# Patient Record
Sex: FEMALE | Race: OTHER | Employment: FULL TIME | URBAN - METROPOLITAN AREA
[De-identification: names, ages, dates, MRNs, and addresses within clinical notes are randomized per-mention and may not be internally consistent; named-entity substitution may affect disease eponyms.]

---

## 2024-05-06 ENCOUNTER — APPOINTMENT (OUTPATIENT)
Age: 49
End: 2024-05-06
Payer: COMMERCIAL

## 2024-05-06 ENCOUNTER — HOSPITAL ENCOUNTER (EMERGENCY)
Age: 49
Discharge: HOME | End: 2024-05-06
Attending: EMERGENCY MEDICINE
Payer: COMMERCIAL

## 2024-05-06 VITALS
DIASTOLIC BLOOD PRESSURE: 93 MMHG | SYSTOLIC BLOOD PRESSURE: 142 MMHG | OXYGEN SATURATION: 100 % | TEMPERATURE: 98 F | WEIGHT: 229.1 LBS | RESPIRATION RATE: 17 BRPM | HEART RATE: 70 BPM | BODY MASS INDEX: 35.96 KG/M2 | HEIGHT: 67 IN

## 2024-05-06 DIAGNOSIS — T07.XXXA MULTIPLE CONTUSIONS: Primary | ICD-10-CM

## 2024-05-06 PROCEDURE — 73562 X-RAY EXAM OF KNEE 3: CPT

## 2024-05-06 PROCEDURE — 73030 X-RAY EXAM OF SHOULDER: CPT

## 2024-05-06 PROCEDURE — 73110 X-RAY EXAM OF WRIST: CPT

## 2024-05-06 PROCEDURE — 99283 EMERGENCY DEPT VISIT LOW MDM: CPT

## 2024-05-06 PROCEDURE — 6370000000 HC RX 637 (ALT 250 FOR IP): Performed by: EMERGENCY MEDICINE

## 2024-05-06 RX ORDER — IBUPROFEN 800 MG/1
800 TABLET ORAL EVERY 8 HOURS PRN
Qty: 15 TABLET | Refills: 3 | Status: SHIPPED | OUTPATIENT
Start: 2024-05-06

## 2024-05-06 RX ORDER — ACETAMINOPHEN 325 MG/1
650 TABLET ORAL ONCE
Status: COMPLETED | OUTPATIENT
Start: 2024-05-06 | End: 2024-05-06

## 2024-05-06 RX ADMIN — ACETAMINOPHEN 650 MG: 325 TABLET ORAL at 11:43

## 2024-05-06 ASSESSMENT — PAIN DESCRIPTION - ORIENTATION
ORIENTATION: RIGHT;LEFT
ORIENTATION: LEFT;RIGHT

## 2024-05-06 ASSESSMENT — PAIN DESCRIPTION - LOCATION
LOCATION: SHOULDER;WRIST;KNEE
LOCATION: WRIST;KNEE

## 2024-05-06 ASSESSMENT — PAIN DESCRIPTION - DESCRIPTORS
DESCRIPTORS: ACHING
DESCRIPTORS: SHARP

## 2024-05-06 ASSESSMENT — PAIN SCALES - GENERAL
PAINLEVEL_OUTOF10: 7
PAINLEVEL_OUTOF10: 9

## 2024-05-06 ASSESSMENT — LIFESTYLE VARIABLES
HOW OFTEN DO YOU HAVE A DRINK CONTAINING ALCOHOL: NEVER
HOW MANY STANDARD DRINKS CONTAINING ALCOHOL DO YOU HAVE ON A TYPICAL DAY: PATIENT DOES NOT DRINK

## 2024-05-06 NOTE — ED PROVIDER NOTES
following medications:   Medications   acetaminophen (TYLENOL) tablet 650 mg (650 mg Oral Given 5/6/24 1143)        CONSULTS:   None   Discussion with Other Professionals: None   {Social Determinants: None   Chronic Conditions:  None    Records Reviewed: NA      Disposition Considerations: Home, self-care  I am the Primary Clinician of Record.        FINAL IMPRESSION    1. Multiple contusions           DISPOSITION/PLAN:     Review imaging results with patient  Provide referral for outpatient primary care  Electronically prescribed Motrin 800 mg  Print instructions on contusions for patient  Discharge outpatient management        Pt discharged home in stable condition.     PATIENT REFERRED TO:   [facility]  [phone]        [facility] Emergency Dept  [address] [place] [address] [phone]  Go to   As needed       DISCHARGE MEDICATIONS:   New Prescriptions    IBUPROFEN (ADVIL;MOTRIN) 800 MG TABLET    Take 1 tablet by mouth every 8 hours as needed for Pain        DISCONTINUED MEDICATIONS:   Discontinued Medications    No medications on file              (Please note that portions of this note were completed with a voice recognition program.  Efforts were made to edit the dictations but occasionally words are mis-transcribed.)       Joel Rios MD (electronically signed)              Joel Rios MD  05/06/24 2009

## 2024-05-06 NOTE — ED TRIAGE NOTES
Patient presents to ED after a mechanical fall, patient reportedly fell over a co-worker. Fell on left side. Complains of left shoulder, left wrist, right wrist and right knee pain. Did not strike head.

## 2024-05-07 ENCOUNTER — TELEPHONE (OUTPATIENT)
Dept: ORTHOPEDIC SURGERY | Age: 49
End: 2024-05-07

## 2024-05-07 NOTE — TELEPHONE ENCOUNTER
Per patient no appointment in needed at this time. Upon return call please schedule with Dr. Cantor.

## 2024-05-09 ENCOUNTER — HOSPITAL ENCOUNTER (EMERGENCY)
Age: 49
Discharge: HOME | End: 2024-05-09
Attending: EMERGENCY MEDICINE
Payer: COMMERCIAL

## 2024-05-09 VITALS
WEIGHT: 225.09 LBS | RESPIRATION RATE: 16 BRPM | BODY MASS INDEX: 35.33 KG/M2 | OXYGEN SATURATION: 99 % | SYSTOLIC BLOOD PRESSURE: 135 MMHG | TEMPERATURE: 97.9 F | HEART RATE: 67 BPM | HEIGHT: 67 IN | DIASTOLIC BLOOD PRESSURE: 93 MMHG

## 2024-05-09 DIAGNOSIS — T18.108A FOREIGN BODY IN ESOPHAGUS, INITIAL ENCOUNTER: Primary | ICD-10-CM

## 2024-05-09 PROCEDURE — 99282 EMERGENCY DEPT VISIT SF MDM: CPT

## 2024-05-09 RX ORDER — TEMOZOLOMIDE 5 MG/1
330 CAPSULE ORAL DAILY
COMMUNITY
Start: 2024-03-14

## 2024-05-09 RX ORDER — ONDANSETRON 4 MG/1
TABLET, FILM COATED ORAL
COMMUNITY
Start: 2024-02-29

## 2024-05-09 RX ORDER — TEMOZOLOMIDE 180 MG/1
330 CAPSULE ORAL DAILY
COMMUNITY
Start: 2024-03-14

## 2024-05-09 RX ORDER — TEMOZOLOMIDE 140 MG/1
330 CAPSULE ORAL DAILY
COMMUNITY
Start: 2024-03-14

## 2024-05-09 RX ORDER — PHENYTOIN SODIUM 100 MG/1
300 CAPSULE, EXTENDED RELEASE ORAL
COMMUNITY
Start: 2024-01-04

## 2024-05-09 RX ORDER — LEVETIRACETAM 500 MG/1
1500 TABLET ORAL 2 TIMES DAILY
COMMUNITY
Start: 2024-04-10

## 2024-05-09 ASSESSMENT — PAIN - FUNCTIONAL ASSESSMENT: PAIN_FUNCTIONAL_ASSESSMENT: NONE - DENIES PAIN

## 2024-05-09 ASSESSMENT — PAIN DESCRIPTION - ONSET: ONSET: ON-GOING

## 2024-05-10 NOTE — ED TRIAGE NOTES
Patient presents ambulatory to room 5 accompanied by daughter w/ c/o feeling of pill stuck in throat since yesterday around 8pm. Patient reports \"taking chemo pill and getting stuck. Now feeling nausea when swallowing. Denies SOB. Speaking in complete sentences, no drooling noted. Patient is A&OX4. Respirations are even and unlabored. Skin is warm, dry, and appropriate for ethnicity. No acute distress noted.

## 2024-05-10 NOTE — ED PROVIDER NOTES
I PERSONALLY SAW THE PATIENT AND PERFORMED A SUBSTANTIVE PORTION OF THE VISIT INCLUDING ALL ASPECTS OF THE MEDICAL DECISION MAKING PROCESS.    [facility] EMERGENCY DEPT  EMERGENCY DEPARTMENT ENCOUNTER      Pt Name: Scar Charlton  MRN: [id number]  Birthdate 1975  Date of evaluation: 5/9/2024  Provider: Enrique Villela MD    CHIEF COMPLAINT     Foreign body sensation in esophagus    HISTORY OF PRESENT ILLNESS    Scar Charlton is a 48 y.o. female who presents to the emergency department with foreign body esophagus patient patient has the sensation of foreign body in the esophagus.  Thinks a pill stuck in the back of her throat.  Tolerating secretions.  Eating and drinking normally.  No pain.  No other associated symptoms.  No rash.  No sick contacts.    Nursing Notes were reviewed. Including nursing noted for FM, Surgical History, Past Medical History, Social History, vitals, and allergies; agree with all.     REVIEW OF SYSTEMS       Review of Systems    Except as noted above the remainder of the review of systems was reviewed and negative.     PAST MEDICAL HISTORY     Past Medical History:   Diagnosis Date    Brain tumor (benign) (HCC)        SURGICAL HISTORY     History reviewed. No pertinent surgical history.    CURRENT MEDICATIONS       Previous Medications    IBUPROFEN (ADVIL;MOTRIN) 800 MG TABLET    Take 1 tablet by mouth every 8 hours as needed for Pain    LEVETIRACETAM (KEPPRA) 500 MG TABLET    Take 3 tablets by mouth 2 times daily    ONDANSETRON (ZOFRAN) 4 MG TABLET    Please take 1 tablet daily  1 hour prior to chemotherapy daily  to prevent nausea and as needed.    PHENYTOIN (DILANTIN) 100 MG ER CAPSULE    Take 3 capsules by mouth 3 capsules qam and 2 capsules qhs    TEMOZOLOMIDE (TEMODAR) 140 MG CHEMO CAPSULE    Take 330 mg by mouth daily    TEMOZOLOMIDE (TEMODAR) 180 MG CHEMO CAPSULE    Take 330 mg by mouth daily    TEMOZOLOMIDE (TEMODAR) 5 MG CHEMO CAPSULE    Take 66 capsules (330 mg total) by

## 2024-05-10 NOTE — ED NOTES
Patient prepared for and ready to be discharged. Patient discharged at this time to home in care of self in no acute distress after verbalizing understanding of discharge instructions. Patient left after receiving After Visit Summary instructions.

## 2024-07-11 ENCOUNTER — HOSPITAL ENCOUNTER (EMERGENCY)
Age: 49
Discharge: HOME | End: 2024-07-11
Attending: EMERGENCY MEDICINE
Payer: COMMERCIAL

## 2024-07-11 ENCOUNTER — APPOINTMENT (OUTPATIENT)
Age: 49
End: 2024-07-11
Payer: COMMERCIAL

## 2024-07-11 VITALS
RESPIRATION RATE: 16 BRPM | HEIGHT: 67 IN | WEIGHT: 226.8 LBS | TEMPERATURE: 98 F | OXYGEN SATURATION: 97 % | HEART RATE: 72 BPM | BODY MASS INDEX: 35.6 KG/M2 | DIASTOLIC BLOOD PRESSURE: 82 MMHG | SYSTOLIC BLOOD PRESSURE: 127 MMHG

## 2024-07-11 DIAGNOSIS — S09.90XA INJURY OF HEAD, INITIAL ENCOUNTER: Primary | ICD-10-CM

## 2024-07-11 PROCEDURE — 70450 CT HEAD/BRAIN W/O DYE: CPT

## 2024-07-11 PROCEDURE — 99284 EMERGENCY DEPT VISIT MOD MDM: CPT

## 2024-07-11 ASSESSMENT — PAIN DESCRIPTION - LOCATION: LOCATION: HEAD

## 2024-07-11 ASSESSMENT — PAIN DESCRIPTION - ORIENTATION: ORIENTATION: UPPER

## 2024-07-11 ASSESSMENT — PAIN - FUNCTIONAL ASSESSMENT: PAIN_FUNCTIONAL_ASSESSMENT: ACTIVITIES ARE NOT PREVENTED

## 2024-07-11 ASSESSMENT — PAIN DESCRIPTION - DESCRIPTORS: DESCRIPTORS: ACHING

## 2024-07-11 ASSESSMENT — PAIN SCALES - GENERAL: PAINLEVEL_OUTOF10: 5

## 2024-07-12 NOTE — ED NOTES
Discharge order received. Discharge instructions reviewed with patient and daughter, who verbalized understanding. Patient discharged by wheelchair to personal vehicle in stable condition.

## 2024-07-12 NOTE — ED PROVIDER NOTES
I PERSONALLY SAW THE PATIENT AND PERFORMED A SUBSTANTIVE PORTION OF THE VISIT INCLUDING ALL ASPECTS OF THE MEDICAL DECISION MAKING PROCESS.    [facility] EMERGENCY DEPT  EMERGENCY DEPARTMENT ENCOUNTER      Pt Name: Scar Charlton  MRN: [id number]  Birthdate 1975  Date of evaluation: 7/11/2024  Provider: Enrique Villela MD    CHIEF COMPLAINT       Chief Complaint   Patient presents with    Fall     Pt was walking upstairs, and fell backwards. Pt states she fell bc she ws holding a lot of items. Denies loss of consciousness. Pt hit head on concrete floor.  Pt has brain tumor, is on chem w [employer].   Pt denies n/v.        HISTORY OF PRESENT ILLNESS    Scar Charlton is a 48 y.o. female who presents to the emergency department with fall. Mechanical. Hit head. No other trauma. No pain noted,.     Nursing Notes were reviewed. Including nursing noted for FM, Surgical History, Past Medical History, Social History, vitals, and allergies; agree with all.     REVIEW OF SYSTEMS       Review of Systems    Except as noted above the remainder of the review of systems was reviewed and negative.     PAST MEDICAL HISTORY     Past Medical History:   Diagnosis Date    Brain tumor (benign) (HCC)        SURGICAL HISTORY     No past surgical history on file.    CURRENT MEDICATIONS       Discharge Medication List as of 7/11/2024 11:07 PM        CONTINUE these medications which have NOT CHANGED    Details   levETIRAcetam (KEPPRA) 500 MG tablet Take 3 tablets by mouth 2 times dailyHistorical Med      phenytoin (DILANTIN) 100 MG ER capsule Take 3 capsules by mouth 3 capsules qam and 2 capsules qhsHistorical Med      !! temozolomide (TEMODAR) 140 MG chemo capsule Take 330 mg by mouth dailyHistorical Med      !! temozolomide (TEMODAR) 180 MG chemo capsule Take 330 mg by mouth dailyHistorical Med      !! temozolomide (TEMODAR) 5 MG chemo capsule Take 66 capsules (330 mg total) by mouth dailyHistorical Med      ondansetron (ZOFRAN) 4 MG

## 2024-08-05 ENCOUNTER — OFFICE VISIT (OUTPATIENT)
Age: 49
End: 2024-08-05
Payer: COMMERCIAL

## 2024-08-05 ENCOUNTER — HOSPITAL ENCOUNTER (OUTPATIENT)
Age: 49
Discharge: HOME | End: 2024-08-05
Payer: COMMERCIAL

## 2024-08-05 VITALS — BODY MASS INDEX: 35.47 KG/M2 | HEIGHT: 67 IN | WEIGHT: 226 LBS

## 2024-08-05 DIAGNOSIS — M25.561 RIGHT KNEE PAIN, UNSPECIFIED CHRONICITY: Primary | ICD-10-CM

## 2024-08-05 DIAGNOSIS — M25.561 RIGHT KNEE PAIN, UNSPECIFIED CHRONICITY: ICD-10-CM

## 2024-08-05 DIAGNOSIS — M25.361 KNEE INSTABILITY, RIGHT: Primary | ICD-10-CM

## 2024-08-05 PROCEDURE — 99203 OFFICE O/P NEW LOW 30 MIN: CPT | Performed by: ORTHOPAEDIC SURGERY

## 2024-08-05 PROCEDURE — 73564 X-RAY EXAM KNEE 4 OR MORE: CPT

## 2024-08-05 PROCEDURE — L1812 KO ELASTIC W/JOINTS PRE OTS: HCPCS | Performed by: ORTHOPAEDIC SURGERY

## 2024-08-05 NOTE — PROGRESS NOTES
Date of Encounter: 8/5/2024  Patient Name:Scar Charlton  Medical Record Number: [id number]    Chief Complaint   Patient presents with    Knee Pain     Right Knee Injury       History of Present Illness:  Scar Charlton is a 48 y.o. female here for evaluation of her right knee.  Her current symptoms are described below and I reviewed them with her today.  She sustained a fall at work 5/6/24 and was initially evaluated in the [place] emergency department after a trip and fall injury.  She did not strike her head or lose consciousness.  Initial x-rays in the ED on her knee were negative.  She states she has continued to have episodes where the knee will give out on her causing her to fall.  She has pain with walking and pain utilizing stairs.  No history of prior injury or surgery on this knee.  No numbness or tingling that radiates down the leg.  No significant joint effusion.  No catching or locking.    Pain Assessment  Location of Pain: Knee  Location Modifiers: Right, Anterior  Severity of Pain: 10  Quality of Pain: Aching, Buckling, Dull  Duration of Pain: Persistent  Frequency of Pain: Constant  Aggravating Factors: Exercise, Standing, Walking, Stairs  Limiting Behavior: Yes  Result of Injury: Yes  Work-Related Injury: Yes  Are there other pain locations you wish to document?: No    Past Medical History:   Diagnosis Date    Brain tumor (benign) (HCC)        History reviewed. No pertinent surgical history.    Current Outpatient Medications   Medication Sig Dispense Refill    levETIRAcetam (KEPPRA) 500 MG tablet Take 3 tablets by mouth 2 times daily      phenytoin (DILANTIN) 100 MG ER capsule Take 3 capsules by mouth 3 capsules qam and 2 capsules qhs      temozolomide (TEMODAR) 140 MG chemo capsule Take 330 mg by mouth daily      temozolomide (TEMODAR) 180 MG chemo capsule Take 330 mg by mouth daily      temozolomide (TEMODAR) 5 MG chemo capsule Take 66 capsules (330 mg total) by mouth daily      ondansetron